# Patient Record
Sex: MALE | URBAN - NONMETROPOLITAN AREA
[De-identification: names, ages, dates, MRNs, and addresses within clinical notes are randomized per-mention and may not be internally consistent; named-entity substitution may affect disease eponyms.]

---

## 2017-04-07 PROBLEM — H52.223: Noted: 2017-04-07

## 2017-04-07 PROBLEM — H52.13: Noted: 2017-04-07

## 2017-04-07 PROBLEM — E11.9: Noted: 2017-04-07

## 2019-05-10 ENCOUNTER — IMPORTED ENCOUNTER (OUTPATIENT)
Dept: URBAN - NONMETROPOLITAN AREA CLINIC 1 | Facility: CLINIC | Age: 44
End: 2019-05-10

## 2019-05-10 PROCEDURE — S0621 ROUTINE OPHTHALMOLOGICAL EXA: HCPCS

## 2019-05-10 NOTE — PATIENT DISCUSSION
*[facility]:. -  A new spectacle prescription was created and dispensed today. *Diabetic w/o  Retinopathy:. -  Discussed findings of exam in detail with the patient. -  discussed the risk of diabetic damage of the retina with potential vision loss and the importance of routine follow-up.

## 2020-07-20 ENCOUNTER — IMPORTED ENCOUNTER (OUTPATIENT)
Dept: URBAN - NONMETROPOLITAN AREA CLINIC 1 | Facility: CLINIC | Age: 45
End: 2020-07-20

## 2020-07-20 PROBLEM — H52.223: Noted: 2020-07-20

## 2020-07-20 PROBLEM — H52.13: Noted: 2020-07-20

## 2020-07-20 PROBLEM — E11.9: Noted: 2020-07-20

## 2020-07-20 PROCEDURE — S0621 ROUTINE OPHTHALMOLOGICAL EXA: HCPCS

## 2020-07-20 NOTE — PATIENT DISCUSSION
"""DM s DR-Stressed the importance of keeping blood sugars under control blood pressure under control and weight normalization and regular visits with PCP. -Explained the possible effects of poorly controlled diabetes and the damage that diabetes can cause to ocular health. -Patient to check HgbA1C.-Pt instructed to contact our office with any vision changes. Myopia-Discussed diagnosis with patient. -Explained that people who are myopic are at a higher risk for developing RD/RT and reviewed associated S&S.-Pt to contact our office if symptoms develop. Astigmatism-Discussed diagnosis with patient. Updated spec Rx given. Recommend lens that will provide comfort as well as protect safety and health of eyes. "

## 2021-07-27 ENCOUNTER — IMPORTED ENCOUNTER (OUTPATIENT)
Dept: URBAN - NONMETROPOLITAN AREA CLINIC 1 | Facility: CLINIC | Age: 46
End: 2021-07-27

## 2021-07-27 PROCEDURE — 92015 DETERMINE REFRACTIVE STATE: CPT

## 2021-07-27 PROCEDURE — 92014 COMPRE OPH EXAM EST PT 1/>: CPT

## 2022-04-09 ASSESSMENT — VISUAL ACUITY
OS_CC: 20/40
OD_CC: 20/25
OS_CC: 20/40-2 BLURRY
OD_CC: 20/30
OD_CC: 20/20
OS_CC: 20/40

## 2022-04-09 ASSESSMENT — TONOMETRY
OD_IOP_MMHG: 15
OS_IOP_MMHG: 15
OS_IOP_MMHG: 15
OD_IOP_MMHG: 15

## 2023-01-05 ENCOUNTER — ESTABLISHED PATIENT (OUTPATIENT)
Dept: RURAL CLINIC 1 | Facility: CLINIC | Age: 48
End: 2023-01-05

## 2023-01-05 DIAGNOSIS — E11.9: ICD-10-CM

## 2023-01-05 DIAGNOSIS — H52.223: ICD-10-CM

## 2023-01-05 DIAGNOSIS — H52.13: ICD-10-CM

## 2023-01-05 PROCEDURE — 92014 COMPRE OPH EXAM EST PT 1/>: CPT

## 2023-01-05 PROCEDURE — 92015 DETERMINE REFRACTIVE STATE: CPT

## 2023-01-05 ASSESSMENT — VISUAL ACUITY
OD_SC: 20/30-1
OS_SC: 20/40
OS_PH: 20/30+2
OU_SC: 20/40
OU_SC: 20/30

## 2023-12-22 ENCOUNTER — ESTABLISHED PATIENT (OUTPATIENT)
Dept: RURAL CLINIC 1 | Facility: CLINIC | Age: 48
End: 2023-12-22

## 2023-12-22 DIAGNOSIS — E11.9: ICD-10-CM

## 2023-12-22 DIAGNOSIS — H52.13: ICD-10-CM

## 2023-12-22 DIAGNOSIS — H52.223: ICD-10-CM

## 2023-12-22 PROCEDURE — 92015 DETERMINE REFRACTIVE STATE: CPT

## 2023-12-22 PROCEDURE — 92014 COMPRE OPH EXAM EST PT 1/>: CPT

## 2023-12-22 ASSESSMENT — VISUAL ACUITY
OD_SC: 20/50-2
OS_SC: 20/50-1

## 2023-12-22 ASSESSMENT — TONOMETRY
OS_IOP_MMHG: 15
OD_IOP_MMHG: 15

## 2025-01-10 ENCOUNTER — COMPREHENSIVE EXAM (OUTPATIENT)
Age: 50
End: 2025-01-10

## 2025-01-10 DIAGNOSIS — H52.223: ICD-10-CM

## 2025-01-10 DIAGNOSIS — H52.13: ICD-10-CM

## 2025-01-10 DIAGNOSIS — E11.9: ICD-10-CM

## 2025-01-10 PROCEDURE — 92015 DETERMINE REFRACTIVE STATE: CPT

## 2025-01-10 PROCEDURE — 92014 COMPRE OPH EXAM EST PT 1/>: CPT
